# Patient Record
(demographics unavailable — no encounter records)

---

## 2025-04-10 NOTE — HISTORY OF PRESENT ILLNESS
[FreeTextEntry1] : A 23 year old man with seronegative spondylarthritis with sacroiliitis  presenting to establish care Other medical history: Anxiety on lexipro, gerd  Seronegative axial/SIJ arthritis Disease onset: 8 years ago Presenting symptoms: Back pain, no responding PT, Allergic to ibuprofen, had to get cortisone shots into right SIJ for pain relief, Was started on Humira bout 6 years ago Current regimen: Humira every 2 weeks, doing well, no relapse on humira Off note had an skin injection after fishing accident , treated with abx with resolution. When he was off during this time. He had recurrence of back pain.   Today: He is not in pain Denies peripheral joint or back pain, morning stiffness, red eye, psoriasis, IBD symptoms, skin rash, cough, SOB, fever. Currently getting his Humira from his rheumatologist in [place], at [facility] . Wants to establish care in [place] , as he moved here 8 months ago

## 2025-04-10 NOTE — ASSESSMENT
[FreeTextEntry1] : A 23 year woman with seronegative spondylarthritis with sacroiliitis presenting to establish care  Seronegative axial/SIJ arthritis Disease onset: 8 years ago Presenting symptoms: Back pain, no responding PT, Allergic to ibuprofen, had to get cortisone shots into right SIJ for pain relief, Was started on Humira bout 6 years ago Current regimen: Humira every 2 weeks, doing well, no relapse on humira Off note had an skin injection after fishing accident , treated with abx with resolution. When he was off during this time. He had recurrence of back pain. -Currently without active symptoms. here to establish care with rheumatologist  Recommendations: -Please get your rheumatologist to fax previous work up and recent MRI  -Will send [id number], inflammatory markers, RF, CCP, hep b, c, TB quant gold  -Will send Humira . Pending prior auth -Please establish PCP in [place]

## 2025-04-10 NOTE — END OF VISIT
[] : Fellow [FreeTextEntry3] : patient seen and examined with the Fellow 23 y old F with PMH of Spondylarthritis Dx 8 years ago   tx and in Remission with Humira every 2 weeks for years no other extra curricular symptoms  blood work and imaging ordered for further  evaluation , cw current treatment, side affects discussed , advised to send us records from Prior Rheumatology  [Time Spent: ___ minutes] : I have spent [unfilled] minutes of time on the encounter which excludes teaching and separately reported services.

## 2025-04-10 NOTE — HISTORY OF PRESENT ILLNESS
[FreeTextEntry1] : A 23 year old man with seronegative spondylarthritis with sacroiliitis  presenting to establish care Other medical history: Anxiety on lexipro, gerd  Seronegative axial/SIJ arthritis Disease onset: 8 years ago Presenting symptoms: Back pain, no responding PT, Allergic to ibuprofen, had to get cortisone shots into right SIJ for pain relief, Was started on Humira bout 6 years ago Current regimen: Humira every 2 weeks, doing well, no relapse on humira Off note had an skin injection after fishing accident , treated with abx with resolution. When he was off during this time. He had recurrence of back pain.   Today: He is not in pain Denies peripheral joint or back pain, morning stiffness, red eye, psoriasis, IBD symptoms, skin rash, cough, SOB, fever. Currently getting his Humira from his rheumatologist in [place], at [facility] . Wants to establish care in [place] , as he moved here 8 months ago